# Patient Record
Sex: MALE | URBAN - METROPOLITAN AREA
[De-identification: names, ages, dates, MRNs, and addresses within clinical notes are randomized per-mention and may not be internally consistent; named-entity substitution may affect disease eponyms.]

---

## 2019-09-24 ENCOUNTER — APPOINTMENT (RX ONLY)
Dept: URBAN - METROPOLITAN AREA CLINIC 151 | Facility: CLINIC | Age: 39
Setting detail: DERMATOLOGY
End: 2019-09-24

## 2019-09-24 DIAGNOSIS — L21.8 OTHER SEBORRHEIC DERMATITIS: ICD-10-CM

## 2019-09-24 DIAGNOSIS — L81.4 OTHER MELANIN HYPERPIGMENTATION: ICD-10-CM

## 2019-09-24 DIAGNOSIS — Z87.2 PERSONAL HISTORY OF DISEASES OF THE SKIN AND SUBCUTANEOUS TISSUE: ICD-10-CM

## 2019-09-24 DIAGNOSIS — D18.0 HEMANGIOMA: ICD-10-CM

## 2019-09-24 DIAGNOSIS — D22 MELANOCYTIC NEVI: ICD-10-CM

## 2019-09-24 DIAGNOSIS — L81.3 CAFÉ AU LAIT SPOTS: ICD-10-CM

## 2019-09-24 DIAGNOSIS — H01.13 ECZEMATOUS DERMATITIS OF EYELID: ICD-10-CM

## 2019-09-24 PROBLEM — H01.139 ECZEMATOUS DERMATITIS OF UNSPECIFIED EYE, UNSPECIFIED EYELID: Status: ACTIVE | Noted: 2019-09-24

## 2019-09-24 PROBLEM — D18.01 HEMANGIOMA OF SKIN AND SUBCUTANEOUS TISSUE: Status: ACTIVE | Noted: 2019-09-24

## 2019-09-24 PROBLEM — D22.62 MELANOCYTIC NEVI OF LEFT UPPER LIMB, INCLUDING SHOULDER: Status: ACTIVE | Noted: 2019-09-24

## 2019-09-24 PROCEDURE — ? OBSERVATION AND MEASURE

## 2019-09-24 PROCEDURE — ? DIAGNOSIS COMMENT

## 2019-09-24 PROCEDURE — 99213 OFFICE O/P EST LOW 20 MIN: CPT

## 2019-09-24 PROCEDURE — ? PRESCRIPTION

## 2019-09-24 PROCEDURE — ? COUNSELING

## 2019-09-24 RX ORDER — SULFACETAMIDE SODIUM AND SULFUR 10; 5 MG/G; MG/G
RINSE TOPICAL
Qty: 1 | Refills: 2 | Status: ERX | COMMUNITY
Start: 2019-09-24

## 2019-09-24 RX ORDER — TACROLIMUS 1 MG/G
OINTMENT TOPICAL BID
Qty: 1 | Refills: 2 | Status: ERX | COMMUNITY
Start: 2019-09-24

## 2019-09-24 RX ADMIN — SULFACETAMIDE SODIUM AND SULFUR: 10; 5 RINSE TOPICAL at 18:33

## 2019-09-24 RX ADMIN — TACROLIMUS: 1 OINTMENT TOPICAL at 18:32

## 2019-09-24 ASSESSMENT — LOCATION SIMPLE DESCRIPTION DERM
LOCATION SIMPLE: ABDOMEN
LOCATION SIMPLE: RIGHT THIGH
LOCATION SIMPLE: RIGHT FOREARM
LOCATION SIMPLE: LEFT FOREARM
LOCATION SIMPLE: LEFT SMALL FINGER

## 2019-09-24 ASSESSMENT — LOCATION DETAILED DESCRIPTION DERM
LOCATION DETAILED: LEFT DISTAL PALMAR SMALL FINGER
LOCATION DETAILED: LEFT PROXIMAL DORSAL FOREARM
LOCATION DETAILED: PERIUMBILICAL SKIN
LOCATION DETAILED: RIGHT ANTERIOR DISTAL THIGH
LOCATION DETAILED: RIGHT PROXIMAL DORSAL FOREARM

## 2019-09-24 ASSESSMENT — LOCATION ZONE DERM
LOCATION ZONE: ARM
LOCATION ZONE: LEG
LOCATION ZONE: FINGER
LOCATION ZONE: TRUNK

## 2019-09-24 NOTE — PROCEDURE: MIPS QUALITY
Quality 431: Preventive Care And Screening: Unhealthy Alcohol Use - Screening: Patient screened for unhealthy alcohol use using a single question and scores less than 2 times per year
Quality 226: Preventive Care And Screening: Tobacco Use: Screening And Cessation Intervention: Patient screened for tobacco use and is an ex/non-smoker
Quality 130: Documentation Of Current Medications In The Medical Record: Current Medications Documented
Quality 131: Pain Assessment And Follow-Up: Pain assessment using a standardized tool is documented as negative, no follow-up plan required
Detail Level: Detailed

## 2019-09-24 NOTE — PROCEDURE: OBSERVATION
Morphology Per Location (Optional): Brown macule, pigment is in the grooves.
Detail Level: Detailed
Size Of Lesion: 1mm

## 2019-09-24 NOTE — PROCEDURE: DIAGNOSIS COMMENT
Detail Level: Simple
Comment: Will start treatment with Protopic 0.1% BID until clear restart PRN. If rash recurs frequently, recommended to get patch tested.
Comment: Will start treatment with Sulfur wash QD.

## 2022-09-15 ENCOUNTER — APPOINTMENT (RX ONLY)
Dept: URBAN - METROPOLITAN AREA OTHER 4 | Facility: OTHER | Age: 42
Setting detail: DERMATOLOGY
End: 2022-09-15

## 2022-09-15 ENCOUNTER — RX ONLY (OUTPATIENT)
Age: 42
Setting detail: RX ONLY
End: 2022-09-15

## 2022-09-15 DIAGNOSIS — L30.4 ERYTHEMA INTERTRIGO: ICD-10-CM

## 2022-09-15 DIAGNOSIS — L71.8 OTHER ROSACEA: ICD-10-CM

## 2022-09-15 DIAGNOSIS — L90.5 SCAR CONDITIONS AND FIBROSIS OF SKIN: ICD-10-CM

## 2022-09-15 DIAGNOSIS — D22 MELANOCYTIC NEVI: ICD-10-CM

## 2022-09-15 DIAGNOSIS — L57.8 OTHER SKIN CHANGES DUE TO CHRONIC EXPOSURE TO NONIONIZING RADIATION: ICD-10-CM

## 2022-09-15 PROBLEM — D22.39 MELANOCYTIC NEVI OF OTHER PARTS OF FACE: Status: ACTIVE | Noted: 2022-09-15

## 2022-09-15 PROBLEM — L30.9 DERMATITIS, UNSPECIFIED: Status: ACTIVE | Noted: 2022-09-15

## 2022-09-15 PROBLEM — D22.5 MELANOCYTIC NEVI OF TRUNK: Status: ACTIVE | Noted: 2022-09-15

## 2022-09-15 PROCEDURE — 99204 OFFICE O/P NEW MOD 45 MIN: CPT

## 2022-09-15 PROCEDURE — ? COUNSELING

## 2022-09-15 PROCEDURE — ? PRESCRIPTION

## 2022-09-15 PROCEDURE — ? OBSERVATION

## 2022-09-15 PROCEDURE — ? PRESCRIPTION MEDICATION MANAGEMENT

## 2022-09-15 RX ORDER — HYDROCORTISONE 25 MG/G
CREAM TOPICAL BID
Qty: 30 | Refills: 1 | Status: ERX | COMMUNITY
Start: 2022-09-15

## 2022-09-15 RX ORDER — TRETIONIN 1 MG/G
CREAM TOPICAL
Qty: 45 | Refills: 3

## 2022-09-15 RX ORDER — CLINDAMYCIN PHOSPHATE AND BENZOYL PEROXIDE 10; 37.5 MG/G; MG/G
GEL TOPICAL
Qty: 50 | Refills: 1 | Status: ERX | COMMUNITY
Start: 2022-09-15

## 2022-09-15 RX ORDER — KETOCONAZOLE 20 MG/G
CREAM TOPICAL
Qty: 60 | Refills: 0 | Status: ERX | COMMUNITY
Start: 2022-09-15

## 2022-09-15 RX ORDER — SULFACETAMIDE SODIUM, SULFUR 98; 48 MG/G; MG/G
LIQUID TOPICAL
Qty: 285 | Refills: 2 | Status: ERX | COMMUNITY
Start: 2022-09-15

## 2022-09-15 RX ORDER — METRONIDAZOLE 7.5 MG/G
CREAM TOPICAL QD
Qty: 45 | Refills: 2 | Status: ERX | COMMUNITY
Start: 2022-09-15

## 2022-09-15 RX ADMIN — METRONIDAZOLE: 7.5 CREAM TOPICAL at 00:00

## 2022-09-15 RX ADMIN — SULFACETAMIDE SODIUM, SULFUR: 98; 48 LIQUID TOPICAL at 00:00

## 2022-09-15 RX ADMIN — CLINDAMYCIN PHOSPHATE AND BENZOYL PEROXIDE: 10; 37.5 GEL TOPICAL at 00:00

## 2022-09-15 RX ADMIN — HYDROCORTISONE: 25 CREAM TOPICAL at 00:00

## 2022-09-15 RX ADMIN — KETOCONAZOLE: 20 CREAM TOPICAL at 00:00

## 2022-09-15 ASSESSMENT — SEVERITY ASSESSMENT OVERALL AMONG ALL PATIENTS
IN YOUR EXPERIENCE, AMONG ALL PATIENTS YOU HAVE SEEN WITH THIS CONDITION, HOW SEVERE IS THIS PATIENT'S CONDITION?: MILD TO MODERATE

## 2022-09-15 ASSESSMENT — LOCATION SIMPLE DESCRIPTION DERM
LOCATION SIMPLE: NOSE
LOCATION SIMPLE: RIGHT FOREHEAD
LOCATION SIMPLE: RIGHT CHEEK
LOCATION SIMPLE: LEFT THIGH
LOCATION SIMPLE: GROIN
LOCATION SIMPLE: LEFT UPPER BACK

## 2022-09-15 ASSESSMENT — LOCATION ZONE DERM
LOCATION ZONE: FACE
LOCATION ZONE: LEG
LOCATION ZONE: TRUNK
LOCATION ZONE: NOSE

## 2022-09-15 ASSESSMENT — PAIN INTENSITY VAS: HOW INTENSE IS YOUR PAIN 0 BEING NO PAIN, 10 BEING THE MOST SEVERE PAIN POSSIBLE?: NO PAIN

## 2022-09-15 ASSESSMENT — LOCATION DETAILED DESCRIPTION DERM
LOCATION DETAILED: SUPRAPUBIC SKIN
LOCATION DETAILED: LEFT INFERIOR LATERAL UPPER BACK
LOCATION DETAILED: LEFT MEDIAL UPPER BACK
LOCATION DETAILED: LEFT ANTERIOR PROXIMAL THIGH
LOCATION DETAILED: RIGHT SUPERIOR LATERAL FOREHEAD
LOCATION DETAILED: RIGHT CENTRAL MALAR CHEEK
LOCATION DETAILED: NASAL DORSUM

## 2022-09-15 ASSESSMENT — SEVERITY ASSESSMENT: SEVERITY: MILD TO MODERATE

## 2022-09-15 NOTE — PROCEDURE: PRESCRIPTION MEDICATION MANAGEMENT
Render In Strict Bullet Format?: No
Discontinue Regimen: Has tried and failed Clindamycin gel and otc antifungual cream.
Initiate Treatment: Plexion cleanser wash bid\\nSunscreen am\\nOnexton cream apply qd\\nWill begin with onexton, but if not effective after 2 months, will plan metronidazole
Detail Level: Zone
Initiate Treatment: Hydrocortisone cream mix with Ketoconazole cream apply bid.
Continue Regimen: Tretinoin cream 0.1% apply hs